# Patient Record
Sex: FEMALE | Race: WHITE | Employment: FULL TIME | URBAN - METROPOLITAN AREA
[De-identification: names, ages, dates, MRNs, and addresses within clinical notes are randomized per-mention and may not be internally consistent; named-entity substitution may affect disease eponyms.]

---

## 2018-09-07 ENCOUNTER — HOSPITAL ENCOUNTER (OUTPATIENT)
Age: 43
Discharge: HOME | End: 2018-09-07
Payer: COMMERCIAL

## 2018-09-07 LAB
ALBUMIN SERPL-MCNC: 4.2 G/DL (ref 3.5–5.2)
ALP BLD-CCNC: 33 U/L (ref 35–104)
ALT SERPL-CCNC: 9 U/L (ref 0–32)
ANION GAP SERPL CALCULATED.3IONS-SCNC: 10 MMOL/L (ref 7–16)
AST SERPL-CCNC: 14 U/L (ref 0–31)
BASOPHILS ABSOLUTE: 0.04 E9/L (ref 0–0.2)
BASOPHILS RELATIVE PERCENT: 0.9 % (ref 0–2)
BILIRUB SERPL-MCNC: 0.4 MG/DL (ref 0–1.2)
BUN BLDV-MCNC: 11 MG/DL (ref 6–20)
CALCIUM SERPL-MCNC: 9 MG/DL (ref 8.6–10.2)
CHLORIDE BLD-SCNC: 107 MMOL/L (ref 98–107)
CO2: 25 MMOL/L (ref 22–29)
CREAT SERPL-MCNC: 0.9 MG/DL (ref 0.5–1)
EOSINOPHILS ABSOLUTE: 0.11 E9/L (ref 0.05–0.5)
EOSINOPHILS RELATIVE PERCENT: 2.6 % (ref 0–6)
GFR AFRICAN AMERICAN: >60
GFR NON-AFRICAN AMERICAN: >60 ML/MIN/1.73
GLUCOSE BLD-MCNC: 117 MG/DL (ref 74–109)
HBA1C MFR BLD: 5.5 % (ref 4–5.6)
HCT VFR BLD CALC: 40.6 % (ref 34–48)
HEMOGLOBIN: 13 G/DL (ref 11.5–15.5)
IMMATURE GRANULOCYTES #: 0.01 E9/L
IMMATURE GRANULOCYTES %: 0.2 % (ref 0–5)
LYMPHOCYTES ABSOLUTE: 1.88 E9/L (ref 1.5–4)
LYMPHOCYTES RELATIVE PERCENT: 43.8 % (ref 20–42)
MCH RBC QN AUTO: 29.4 PG (ref 26–35)
MCHC RBC AUTO-ENTMCNC: 32 % (ref 32–34.5)
MCV RBC AUTO: 91.9 FL (ref 80–99.9)
MONOCYTES ABSOLUTE: 0.41 E9/L (ref 0.1–0.95)
MONOCYTES RELATIVE PERCENT: 9.6 % (ref 2–12)
NEUTROPHILS ABSOLUTE: 1.84 E9/L (ref 1.8–7.3)
NEUTROPHILS RELATIVE PERCENT: 42.9 % (ref 43–80)
PDW BLD-RTO: 12.9 FL (ref 11.5–15)
PLATELET # BLD: 252 E9/L (ref 130–450)
PMV BLD AUTO: 10.5 FL (ref 7–12)
POTASSIUM SERPL-SCNC: 4.5 MMOL/L (ref 3.5–5)
RBC # BLD: 4.42 E12/L (ref 3.5–5.5)
SODIUM BLD-SCNC: 142 MMOL/L (ref 132–146)
TOTAL PROTEIN: 6.9 G/DL (ref 6.4–8.3)
VITAMIN D 25-HYDROXY: 33 NG/ML (ref 30–100)
WBC # BLD: 4.3 E9/L (ref 4.5–11.5)

## 2018-09-07 PROCEDURE — 83036 HEMOGLOBIN GLYCOSYLATED A1C: CPT

## 2018-09-07 PROCEDURE — 36415 COLL VENOUS BLD VENIPUNCTURE: CPT

## 2018-09-07 PROCEDURE — 82306 VITAMIN D 25 HYDROXY: CPT

## 2018-09-07 PROCEDURE — 80053 COMPREHEN METABOLIC PANEL: CPT

## 2018-09-07 PROCEDURE — 85025 COMPLETE CBC W/AUTO DIFF WBC: CPT

## 2018-11-05 ENCOUNTER — HOSPITAL ENCOUNTER (OUTPATIENT)
Dept: MAMMOGRAPHY | Age: 43
Discharge: HOME | End: 2018-11-07
Payer: COMMERCIAL

## 2018-11-05 DIAGNOSIS — Z12.39 BREAST SCREENING: ICD-10-CM

## 2018-11-05 PROCEDURE — 77067 SCR MAMMO BI INCL CAD: CPT

## 2018-12-05 ENCOUNTER — HOSPITAL ENCOUNTER (OUTPATIENT)
Age: 43
Discharge: HOME | End: 2018-12-05
Payer: COMMERCIAL

## 2018-12-05 LAB
ALBUMIN SERPL-MCNC: 4.3 G/DL (ref 3.5–5.2)
ALP BLD-CCNC: 34 U/L (ref 35–104)
ALT SERPL-CCNC: 9 U/L (ref 0–32)
ANION GAP SERPL CALCULATED.3IONS-SCNC: 11 MMOL/L (ref 7–16)
AST SERPL-CCNC: 16 U/L (ref 0–31)
BACTERIA: ABNORMAL /HPF
BASOPHILS ABSOLUTE: 0.03 E9/L (ref 0–0.2)
BASOPHILS RELATIVE PERCENT: 0.6 % (ref 0–2)
BILIRUB SERPL-MCNC: 0.5 MG/DL (ref 0–1.2)
BILIRUBIN URINE: NEGATIVE
BLOOD, URINE: ABNORMAL
BUN BLDV-MCNC: 15 MG/DL (ref 6–20)
CALCIUM SERPL-MCNC: 8.9 MG/DL (ref 8.6–10.2)
CHLORIDE BLD-SCNC: 104 MMOL/L (ref 98–107)
CLARITY: CLEAR
CO2: 25 MMOL/L (ref 22–29)
COLOR: YELLOW
CREAT SERPL-MCNC: 0.8 MG/DL (ref 0.5–1)
EOSINOPHILS ABSOLUTE: 0.1 E9/L (ref 0.05–0.5)
EOSINOPHILS RELATIVE PERCENT: 1.9 % (ref 0–6)
EPITHELIAL CELLS, UA: ABNORMAL /HPF
GFR AFRICAN AMERICAN: >60
GFR NON-AFRICAN AMERICAN: >60 ML/MIN/1.73
GLUCOSE BLD-MCNC: 102 MG/DL (ref 74–99)
GLUCOSE URINE: NEGATIVE MG/DL
HBA1C MFR BLD: 5.2 % (ref 4–5.6)
HCT VFR BLD CALC: 39 % (ref 34–48)
HEMOGLOBIN: 12.6 G/DL (ref 11.5–15.5)
IMMATURE GRANULOCYTES #: 0.02 E9/L
IMMATURE GRANULOCYTES %: 0.4 % (ref 0–5)
KETONES, URINE: NEGATIVE MG/DL
LEUKOCYTE ESTERASE, URINE: ABNORMAL
LYMPHOCYTES ABSOLUTE: 1.57 E9/L (ref 1.5–4)
LYMPHOCYTES RELATIVE PERCENT: 30.5 % (ref 20–42)
MCH RBC QN AUTO: 29.6 PG (ref 26–35)
MCHC RBC AUTO-ENTMCNC: 32.3 % (ref 32–34.5)
MCV RBC AUTO: 91.8 FL (ref 80–99.9)
MONOCYTES ABSOLUTE: 0.42 E9/L (ref 0.1–0.95)
MONOCYTES RELATIVE PERCENT: 8.2 % (ref 2–12)
NEUTROPHILS ABSOLUTE: 3.01 E9/L (ref 1.8–7.3)
NEUTROPHILS RELATIVE PERCENT: 58.4 % (ref 43–80)
NITRITE, URINE: NEGATIVE
PDW BLD-RTO: 13 FL (ref 11.5–15)
PH UA: 5.5 (ref 5–9)
PLATELET # BLD: 239 E9/L (ref 130–450)
PMV BLD AUTO: 10.5 FL (ref 7–12)
POTASSIUM SERPL-SCNC: 4.2 MMOL/L (ref 3.5–5)
PROTEIN UA: NEGATIVE MG/DL
RBC # BLD: 4.25 E12/L (ref 3.5–5.5)
RBC UA: ABNORMAL /HPF (ref 0–2)
SODIUM BLD-SCNC: 140 MMOL/L (ref 132–146)
SPECIFIC GRAVITY UA: >=1.03 (ref 1–1.03)
TOTAL PROTEIN: 6.9 G/DL (ref 6.4–8.3)
UROBILINOGEN, URINE: 0.2 E.U./DL
WBC # BLD: 5.2 E9/L (ref 4.5–11.5)
WBC UA: ABNORMAL /HPF (ref 0–5)

## 2018-12-05 PROCEDURE — 36415 COLL VENOUS BLD VENIPUNCTURE: CPT

## 2018-12-05 PROCEDURE — 81001 URINALYSIS AUTO W/SCOPE: CPT

## 2018-12-05 PROCEDURE — 85025 COMPLETE CBC W/AUTO DIFF WBC: CPT

## 2018-12-05 PROCEDURE — 83036 HEMOGLOBIN GLYCOSYLATED A1C: CPT

## 2018-12-05 PROCEDURE — 80053 COMPREHEN METABOLIC PANEL: CPT

## 2019-02-27 ENCOUNTER — HOSPITAL ENCOUNTER (OUTPATIENT)
Age: 44
Discharge: HOME | End: 2019-03-01
Payer: COMMERCIAL

## 2019-02-27 LAB
BACTERIA: ABNORMAL /HPF
BILIRUBIN URINE: NEGATIVE
BLOOD, URINE: ABNORMAL
CLARITY: CLEAR
COLOR: YELLOW
GLUCOSE URINE: NEGATIVE MG/DL
KETONES, URINE: NEGATIVE MG/DL
LEUKOCYTE ESTERASE, URINE: ABNORMAL
NITRITE, URINE: NEGATIVE
PH UA: 5.5 (ref 5–9)
PROTEIN UA: NEGATIVE MG/DL
RBC UA: ABNORMAL /HPF (ref 0–2)
SPECIFIC GRAVITY UA: 1.02 (ref 1–1.03)
UROBILINOGEN, URINE: 0.2 E.U./DL
WBC UA: ABNORMAL /HPF (ref 0–5)

## 2019-02-27 PROCEDURE — 81001 URINALYSIS AUTO W/SCOPE: CPT

## 2019-02-27 PROCEDURE — 87088 URINE BACTERIA CULTURE: CPT

## 2019-02-28 LAB — URINE CULTURE, ROUTINE: NORMAL

## 2019-04-05 ENCOUNTER — HOSPITAL ENCOUNTER (OUTPATIENT)
Age: 44
Discharge: HOME | End: 2019-04-05
Payer: COMMERCIAL

## 2019-04-05 LAB
ALBUMIN SERPL-MCNC: 4.5 G/DL (ref 3.5–5.2)
ALP BLD-CCNC: 39 U/L (ref 35–104)
ALT SERPL-CCNC: 10 U/L (ref 0–32)
ANION GAP SERPL CALCULATED.3IONS-SCNC: 11 MMOL/L (ref 7–16)
AST SERPL-CCNC: 17 U/L (ref 0–31)
BASOPHILS ABSOLUTE: 0.05 E9/L (ref 0–0.2)
BASOPHILS RELATIVE PERCENT: 0.9 % (ref 0–2)
BILIRUB SERPL-MCNC: 0.7 MG/DL (ref 0–1.2)
BUN BLDV-MCNC: 13 MG/DL (ref 6–20)
CALCIUM SERPL-MCNC: 9.3 MG/DL (ref 8.6–10.2)
CHLORIDE BLD-SCNC: 105 MMOL/L (ref 98–107)
CHOLESTEROL, FASTING: 170 MG/DL (ref 0–199)
CO2: 26 MMOL/L (ref 22–29)
CREAT SERPL-MCNC: 0.9 MG/DL (ref 0.5–1)
EOSINOPHILS ABSOLUTE: 0.07 E9/L (ref 0.05–0.5)
EOSINOPHILS RELATIVE PERCENT: 1.3 % (ref 0–6)
GFR AFRICAN AMERICAN: >60
GFR NON-AFRICAN AMERICAN: >60 ML/MIN/1.73
GLUCOSE FASTING: 101 MG/DL (ref 74–99)
HBA1C MFR BLD: 5.6 % (ref 4–5.6)
HCT VFR BLD CALC: 42.6 % (ref 34–48)
HDLC SERPL-MCNC: 46 MG/DL
HEMOGLOBIN: 13.6 G/DL (ref 11.5–15.5)
IMMATURE GRANULOCYTES #: 0.02 E9/L
IMMATURE GRANULOCYTES %: 0.4 % (ref 0–5)
LDL CHOLESTEROL CALCULATED: 109 MG/DL (ref 0–99)
LYMPHOCYTES ABSOLUTE: 1.87 E9/L (ref 1.5–4)
LYMPHOCYTES RELATIVE PERCENT: 34.4 % (ref 20–42)
MCH RBC QN AUTO: 29.6 PG (ref 26–35)
MCHC RBC AUTO-ENTMCNC: 31.9 % (ref 32–34.5)
MCV RBC AUTO: 92.8 FL (ref 80–99.9)
MONOCYTES ABSOLUTE: 0.43 E9/L (ref 0.1–0.95)
MONOCYTES RELATIVE PERCENT: 7.9 % (ref 2–12)
NEUTROPHILS ABSOLUTE: 3 E9/L (ref 1.8–7.3)
NEUTROPHILS RELATIVE PERCENT: 55.1 % (ref 43–80)
PDW BLD-RTO: 13.2 FL (ref 11.5–15)
PLATELET # BLD: 278 E9/L (ref 130–450)
PMV BLD AUTO: 10.7 FL (ref 7–12)
POTASSIUM SERPL-SCNC: 4.4 MMOL/L (ref 3.5–5)
RBC # BLD: 4.59 E12/L (ref 3.5–5.5)
SODIUM BLD-SCNC: 142 MMOL/L (ref 132–146)
TOTAL PROTEIN: 7.6 G/DL (ref 6.4–8.3)
TRIGLYCERIDE, FASTING: 73 MG/DL (ref 0–149)
TSH SERPL DL<=0.05 MIU/L-ACNC: 2.27 UIU/ML (ref 0.27–4.2)
VLDLC SERPL CALC-MCNC: 15 MG/DL
WBC # BLD: 5.4 E9/L (ref 4.5–11.5)

## 2019-04-05 PROCEDURE — 85025 COMPLETE CBC W/AUTO DIFF WBC: CPT

## 2019-04-05 PROCEDURE — 80061 LIPID PANEL: CPT

## 2019-04-05 PROCEDURE — 83036 HEMOGLOBIN GLYCOSYLATED A1C: CPT

## 2019-04-05 PROCEDURE — 36415 COLL VENOUS BLD VENIPUNCTURE: CPT

## 2019-04-05 PROCEDURE — 80053 COMPREHEN METABOLIC PANEL: CPT

## 2019-04-05 PROCEDURE — 84443 ASSAY THYROID STIM HORMONE: CPT

## 2019-05-01 ENCOUNTER — HOSPITAL ENCOUNTER (OUTPATIENT)
Age: 44
Discharge: HOME | End: 2019-05-03
Payer: COMMERCIAL

## 2019-05-01 LAB
BACTERIA: ABNORMAL /HPF
BILIRUBIN URINE: NEGATIVE
BLOOD, URINE: ABNORMAL
CLARITY: CLEAR
COLOR: YELLOW
GLUCOSE URINE: NEGATIVE MG/DL
KETONES, URINE: NEGATIVE MG/DL
LEUKOCYTE ESTERASE, URINE: ABNORMAL
NITRITE, URINE: POSITIVE
PH UA: 5.5 (ref 5–9)
PROTEIN UA: NEGATIVE MG/DL
RBC UA: ABNORMAL /HPF (ref 0–2)
SPECIFIC GRAVITY UA: <=1.005 (ref 1–1.03)
UROBILINOGEN, URINE: 0.2 E.U./DL
WBC UA: ABNORMAL /HPF (ref 0–5)

## 2019-05-01 PROCEDURE — 87186 SC STD MICRODIL/AGAR DIL: CPT

## 2019-05-01 PROCEDURE — 87088 URINE BACTERIA CULTURE: CPT

## 2019-05-01 PROCEDURE — 81001 URINALYSIS AUTO W/SCOPE: CPT

## 2019-05-04 LAB
ORGANISM: ABNORMAL
URINE CULTURE, ROUTINE: ABNORMAL
URINE CULTURE, ROUTINE: ABNORMAL

## 2019-06-03 ENCOUNTER — HOSPITAL ENCOUNTER (OUTPATIENT)
Age: 44
Discharge: HOME | End: 2019-06-05
Payer: COMMERCIAL

## 2019-06-03 PROCEDURE — 81001 URINALYSIS AUTO W/SCOPE: CPT

## 2019-06-03 PROCEDURE — 87088 URINE BACTERIA CULTURE: CPT

## 2019-06-04 LAB
BACTERIA: ABNORMAL /HPF
BILIRUBIN URINE: NEGATIVE
BLOOD, URINE: ABNORMAL
CLARITY: ABNORMAL
COLOR: YELLOW
EPITHELIAL CELLS, UA: ABNORMAL /HPF
GLUCOSE URINE: NEGATIVE MG/DL
KETONES, URINE: NEGATIVE MG/DL
LEUKOCYTE ESTERASE, URINE: ABNORMAL
NITRITE, URINE: POSITIVE
PH UA: 5.5 (ref 5–9)
PROTEIN UA: NEGATIVE MG/DL
RBC UA: ABNORMAL /HPF (ref 0–2)
SPECIFIC GRAVITY UA: 1.01 (ref 1–1.03)
UROBILINOGEN, URINE: 0.2 E.U./DL
WBC UA: ABNORMAL /HPF (ref 0–5)

## 2019-06-05 LAB — URINE CULTURE, ROUTINE: NORMAL

## 2019-09-30 ENCOUNTER — HOSPITAL ENCOUNTER (OUTPATIENT)
Age: 44
Discharge: HOME | End: 2019-09-30
Payer: COMMERCIAL

## 2019-09-30 ENCOUNTER — HOSPITAL ENCOUNTER (OUTPATIENT)
Dept: MAMMOGRAPHY | Age: 44
Discharge: HOME | End: 2019-10-02
Payer: COMMERCIAL

## 2019-09-30 DIAGNOSIS — N64.4 MASTODYNIA: ICD-10-CM

## 2019-09-30 LAB
ALBUMIN SERPL-MCNC: 4.1 G/DL (ref 3.5–5.2)
ALP BLD-CCNC: 41 U/L (ref 35–104)
ALT SERPL-CCNC: 11 U/L (ref 0–32)
ANION GAP SERPL CALCULATED.3IONS-SCNC: 11 MMOL/L (ref 7–16)
AST SERPL-CCNC: 17 U/L (ref 0–31)
BASOPHILS ABSOLUTE: 0.05 E9/L (ref 0–0.2)
BASOPHILS RELATIVE PERCENT: 0.9 % (ref 0–2)
BILIRUB SERPL-MCNC: <0.2 MG/DL (ref 0–1.2)
BUN BLDV-MCNC: 21 MG/DL (ref 6–20)
CALCIUM SERPL-MCNC: 8.6 MG/DL (ref 8.6–10.2)
CHLORIDE BLD-SCNC: 106 MMOL/L (ref 98–107)
CO2: 24 MMOL/L (ref 22–29)
CREAT SERPL-MCNC: 0.8 MG/DL (ref 0.5–1)
EOSINOPHILS ABSOLUTE: 0.12 E9/L (ref 0.05–0.5)
EOSINOPHILS RELATIVE PERCENT: 2.1 % (ref 0–6)
GFR AFRICAN AMERICAN: >60
GFR NON-AFRICAN AMERICAN: >60 ML/MIN/1.73
GLUCOSE BLD-MCNC: 106 MG/DL (ref 74–99)
HBA1C MFR BLD: 5.2 % (ref 4–5.6)
HCT VFR BLD CALC: 37.7 % (ref 34–48)
HEMOGLOBIN: 12.4 G/DL (ref 11.5–15.5)
IMMATURE GRANULOCYTES #: 0.04 E9/L
IMMATURE GRANULOCYTES %: 0.7 % (ref 0–5)
LYMPHOCYTES ABSOLUTE: 1.92 E9/L (ref 1.5–4)
LYMPHOCYTES RELATIVE PERCENT: 34.3 % (ref 20–42)
MCH RBC QN AUTO: 30.6 PG (ref 26–35)
MCHC RBC AUTO-ENTMCNC: 32.9 % (ref 32–34.5)
MCV RBC AUTO: 93.1 FL (ref 80–99.9)
MONOCYTES ABSOLUTE: 0.64 E9/L (ref 0.1–0.95)
MONOCYTES RELATIVE PERCENT: 11.4 % (ref 2–12)
NEUTROPHILS ABSOLUTE: 2.82 E9/L (ref 1.8–7.3)
NEUTROPHILS RELATIVE PERCENT: 50.6 % (ref 43–80)
PDW BLD-RTO: 13.2 FL (ref 11.5–15)
PLATELET # BLD: 237 E9/L (ref 130–450)
PMV BLD AUTO: 10.7 FL (ref 7–12)
POTASSIUM SERPL-SCNC: 4.4 MMOL/L (ref 3.5–5)
RBC # BLD: 4.05 E12/L (ref 3.5–5.5)
SODIUM BLD-SCNC: 141 MMOL/L (ref 132–146)
TOTAL PROTEIN: 6.6 G/DL (ref 6.4–8.3)
VITAMIN D 25-HYDROXY: 35 NG/ML (ref 30–100)
WBC # BLD: 5.6 E9/L (ref 4.5–11.5)

## 2019-09-30 PROCEDURE — 85025 COMPLETE CBC W/AUTO DIFF WBC: CPT

## 2019-09-30 PROCEDURE — 77065 DX MAMMO INCL CAD UNI: CPT

## 2019-09-30 PROCEDURE — 36415 COLL VENOUS BLD VENIPUNCTURE: CPT

## 2019-09-30 PROCEDURE — 83036 HEMOGLOBIN GLYCOSYLATED A1C: CPT

## 2019-09-30 PROCEDURE — 80053 COMPREHEN METABOLIC PANEL: CPT

## 2019-09-30 PROCEDURE — 82306 VITAMIN D 25 HYDROXY: CPT

## 2019-11-11 ENCOUNTER — HOSPITAL ENCOUNTER (OUTPATIENT)
Dept: MAMMOGRAPHY | Age: 44
Discharge: HOME | End: 2019-11-13
Payer: COMMERCIAL

## 2019-11-11 DIAGNOSIS — Z12.39 BREAST CANCER SCREENING: ICD-10-CM

## 2019-11-11 PROCEDURE — 77063 BREAST TOMOSYNTHESIS BI: CPT

## 2020-03-16 ENCOUNTER — HOSPITAL ENCOUNTER (OUTPATIENT)
Age: 45
Discharge: HOME | End: 2020-03-18
Payer: COMMERCIAL

## 2020-03-16 LAB
BACTERIA: ABNORMAL /HPF
BILIRUBIN URINE: NEGATIVE
BLOOD, URINE: ABNORMAL
CLARITY: CLEAR
COLOR: YELLOW
CRYSTALS, UA: ABNORMAL /HPF
GLUCOSE URINE: NEGATIVE MG/DL
KETONES, URINE: NEGATIVE MG/DL
LEUKOCYTE ESTERASE, URINE: ABNORMAL
NITRITE, URINE: NEGATIVE
PH UA: 5 (ref 5–9)
PROTEIN UA: ABNORMAL MG/DL
RBC UA: ABNORMAL /HPF (ref 0–2)
SPECIFIC GRAVITY UA: >=1.03 (ref 1–1.03)
UROBILINOGEN, URINE: 0.2 E.U./DL
WBC UA: >20 /HPF (ref 0–5)

## 2020-03-16 PROCEDURE — 81001 URINALYSIS AUTO W/SCOPE: CPT

## 2020-03-16 PROCEDURE — 87088 URINE BACTERIA CULTURE: CPT

## 2020-03-18 LAB — URINE CULTURE, ROUTINE: NORMAL

## 2020-07-13 ENCOUNTER — HOSPITAL ENCOUNTER (OUTPATIENT)
Age: 45
Discharge: HOME | End: 2020-07-15
Payer: COMMERCIAL

## 2020-07-13 LAB
BILIRUBIN URINE: NEGATIVE
BLOOD, URINE: ABNORMAL
CLARITY: ABNORMAL
COLOR: YELLOW
GLUCOSE URINE: NEGATIVE MG/DL
KETONES, URINE: NEGATIVE MG/DL
LEUKOCYTE ESTERASE, URINE: ABNORMAL
NITRITE, URINE: NEGATIVE
PH UA: 5 (ref 5–9)
PROTEIN UA: NEGATIVE MG/DL
SPECIFIC GRAVITY UA: >=1.03 (ref 1–1.03)
UROBILINOGEN, URINE: 0.2 E.U./DL

## 2020-07-13 PROCEDURE — 87088 URINE BACTERIA CULTURE: CPT

## 2020-07-13 PROCEDURE — 81001 URINALYSIS AUTO W/SCOPE: CPT

## 2020-07-14 LAB
BACTERIA: ABNORMAL /HPF
RBC UA: ABNORMAL /HPF (ref 0–2)
WBC UA: ABNORMAL /HPF (ref 0–5)

## 2020-07-16 LAB — URINE CULTURE, ROUTINE: NORMAL

## 2022-10-27 ENCOUNTER — TELEPHONE (OUTPATIENT)
Dept: INTERNAL MEDICINE CLINIC | Age: 47
End: 2022-10-27

## 2023-06-05 ENCOUNTER — OFFICE VISIT (OUTPATIENT)
Dept: INTERNAL MEDICINE CLINIC | Age: 48
End: 2023-06-05

## 2023-06-05 VITALS
HEART RATE: 80 BPM | OXYGEN SATURATION: 98 % | BODY MASS INDEX: 31.24 KG/M2 | WEIGHT: 183 LBS | TEMPERATURE: 97.9 F | HEIGHT: 64 IN | DIASTOLIC BLOOD PRESSURE: 76 MMHG | SYSTOLIC BLOOD PRESSURE: 132 MMHG

## 2023-06-05 DIAGNOSIS — Z12.31 ENCOUNTER FOR SCREENING MAMMOGRAM FOR BREAST CANCER: ICD-10-CM

## 2023-06-05 DIAGNOSIS — R73.01 IMPAIRED FASTING GLUCOSE: ICD-10-CM

## 2023-06-05 DIAGNOSIS — E55.9 VITAMIN D DEFICIENCY: ICD-10-CM

## 2023-06-05 DIAGNOSIS — R35.0 FREQUENT URINATION: Primary | ICD-10-CM

## 2023-06-05 DIAGNOSIS — R35.0 FREQUENT URINATION: ICD-10-CM

## 2023-06-05 DIAGNOSIS — E66.9 OBESITY (BMI 30-39.9): ICD-10-CM

## 2023-06-05 DIAGNOSIS — Z12.11 ENCOUNTER FOR SCREENING FOR MALIGNANT NEOPLASM OF COLON: ICD-10-CM

## 2023-06-05 LAB
BACTERIA URNS QL MICRO: ABNORMAL /HPF
BILIRUB UR QL STRIP: NEGATIVE
BILIRUBIN, POC: NEGATIVE
BLOOD URINE, POC: ABNORMAL
CLARITY UR: CLEAR
CLARITY, POC: ABNORMAL
COLOR UR: YELLOW
COLOR, POC: CLEAR
EPI CELLS #/AREA URNS HPF: ABNORMAL /HPF
GLUCOSE UR STRIP-MCNC: NEGATIVE MG/DL
GLUCOSE URINE, POC: NEGATIVE
HGB UR QL STRIP: ABNORMAL
KETONES UR STRIP-MCNC: NEGATIVE MG/DL
KETONES, POC: NEGATIVE
LEUKOCYTE EST, POC: ABNORMAL
LEUKOCYTE ESTERASE UR QL STRIP: ABNORMAL
NITRITE UR QL STRIP: NEGATIVE
NITRITE, POC: NEGATIVE
PH UR STRIP: 6 [PH] (ref 5–9)
PH, POC: 6
PROT UR STRIP-MCNC: NEGATIVE MG/DL
PROTEIN, POC: NEGATIVE
RBC #/AREA URNS HPF: ABNORMAL /HPF (ref 0–2)
SP GR UR STRIP: <=1.005 (ref 1–1.03)
SPECIFIC GRAVITY, POC: 1
UROBILINOGEN UR STRIP-ACNC: 0.2 E.U./DL
UROBILINOGEN, POC: 0.2
WBC #/AREA URNS HPF: >20 /HPF (ref 0–5)

## 2023-06-05 PROCEDURE — 99214 OFFICE O/P EST MOD 30 MIN: CPT | Performed by: INTERNAL MEDICINE

## 2023-06-05 PROCEDURE — 81002 URINALYSIS NONAUTO W/O SCOPE: CPT | Performed by: INTERNAL MEDICINE

## 2023-06-05 RX ORDER — AMOXICILLIN 500 MG/1
500 CAPSULE ORAL 3 TIMES DAILY
Qty: 21 CAPSULE | Refills: 0 | Status: SHIPPED | OUTPATIENT
Start: 2023-06-05 | End: 2023-06-12

## 2023-06-05 SDOH — ECONOMIC STABILITY: INCOME INSECURITY: HOW HARD IS IT FOR YOU TO PAY FOR THE VERY BASICS LIKE FOOD, HOUSING, MEDICAL CARE, AND HEATING?: NOT HARD AT ALL

## 2023-06-05 SDOH — ECONOMIC STABILITY: FOOD INSECURITY: WITHIN THE PAST 12 MONTHS, THE FOOD YOU BOUGHT JUST DIDN'T LAST AND YOU DIDN'T HAVE MONEY TO GET MORE.: NEVER TRUE

## 2023-06-05 SDOH — ECONOMIC STABILITY: HOUSING INSECURITY
IN THE LAST 12 MONTHS, WAS THERE A TIME WHEN YOU DID NOT HAVE A STEADY PLACE TO SLEEP OR SLEPT IN A SHELTER (INCLUDING NOW)?: NO

## 2023-06-05 SDOH — ECONOMIC STABILITY: FOOD INSECURITY: WITHIN THE PAST 12 MONTHS, YOU WORRIED THAT YOUR FOOD WOULD RUN OUT BEFORE YOU GOT MONEY TO BUY MORE.: NEVER TRUE

## 2023-06-05 ASSESSMENT — ENCOUNTER SYMPTOMS
SORE THROAT: 0
SHORTNESS OF BREATH: 0
BACK PAIN: 0
TROUBLE SWALLOWING: 0
NAUSEA: 0
EYE PAIN: 0
RHINORRHEA: 0
VOMITING: 0
CHEST TIGHTNESS: 0
VOICE CHANGE: 0
PHOTOPHOBIA: 0
CONSTIPATION: 0
ABDOMINAL PAIN: 0
WHEEZING: 0
DIARRHEA: 0
COUGH: 0
BLOOD IN STOOL: 0

## 2023-06-05 ASSESSMENT — PATIENT HEALTH QUESTIONNAIRE - PHQ9
2. FEELING DOWN, DEPRESSED OR HOPELESS: 0
SUM OF ALL RESPONSES TO PHQ QUESTIONS 1-9: 0
1. LITTLE INTEREST OR PLEASURE IN DOING THINGS: 0
SUM OF ALL RESPONSES TO PHQ9 QUESTIONS 1 & 2: 0
SUM OF ALL RESPONSES TO PHQ QUESTIONS 1-9: 0

## 2023-06-05 NOTE — PROGRESS NOTES
Vitamin D 25 Hydroxy; Future  4. Encounter for screening mammogram for breast cancer  -     [facility]O DIGITAL SCREEN W OR WO CAD BILATERAL; Future  5. Encounter for screening for malignant neoplasm of colon  Patient did not have a colonoscopy in the past.  She wants referred to gastroenterologist Dr. Carley Vasquez  Referral placed. -     Amb External Referral To Gastroenterology  6. Obesity (BMI 30-39. 9)  Diet and exercise advised. Check the following labs  -     Lipid, Fasting; Future  -     TSH; Future      Return in about 4 weeks (around 7/3/2023). An electronic signature was used to authenticate this note.     --Vy Peng MD

## 2023-06-08 LAB — BACTERIA UR CULT: NORMAL

## 2023-06-22 DIAGNOSIS — M25.511 ACUTE PAIN OF RIGHT SHOULDER: Primary | ICD-10-CM

## 2023-06-23 ENCOUNTER — OFFICE VISIT (OUTPATIENT)
Dept: ORTHOPEDIC SURGERY | Age: 48
End: 2023-06-23

## 2023-06-23 VITALS — HEIGHT: 64 IN | BODY MASS INDEX: 30.73 KG/M2 | WEIGHT: 180 LBS | TEMPERATURE: 98 F

## 2023-06-23 DIAGNOSIS — M75.101 TEAR OF RIGHT ROTATOR CUFF, UNSPECIFIED TEAR EXTENT, UNSPECIFIED WHETHER TRAUMATIC: Primary | ICD-10-CM

## 2023-06-23 PROCEDURE — 99203 OFFICE O/P NEW LOW 30 MIN: CPT | Performed by: ORTHOPAEDIC SURGERY

## 2023-06-23 RX ORDER — OMEPRAZOLE 20 MG/1
20 CAPSULE, DELAYED RELEASE ORAL
Qty: 60 CAPSULE | Refills: 0 | Status: SHIPPED | OUTPATIENT
Start: 2023-06-23

## 2023-06-23 RX ORDER — NAPROXEN 500 MG/1
500 TABLET ORAL 2 TIMES DAILY WITH MEALS
Qty: 60 TABLET | Refills: 0 | Status: SHIPPED | OUTPATIENT
Start: 2023-06-23 | End: 2023-07-23

## 2023-07-14 ENCOUNTER — OFFICE VISIT (OUTPATIENT)
Dept: ORTHOPEDIC SURGERY | Age: 48
End: 2023-07-14

## 2023-07-14 VITALS — BODY MASS INDEX: 29.02 KG/M2 | WEIGHT: 170 LBS | TEMPERATURE: 98 F | HEIGHT: 64 IN

## 2023-07-14 DIAGNOSIS — Z71.82 EXERCISE COUNSELING: ICD-10-CM

## 2023-07-14 DIAGNOSIS — M75.41 IMPINGEMENT SYNDROME OF RIGHT SHOULDER: ICD-10-CM

## 2023-07-14 DIAGNOSIS — M75.101 TEAR OF RIGHT ROTATOR CUFF, UNSPECIFIED TEAR EXTENT, UNSPECIFIED WHETHER TRAUMATIC: Primary | ICD-10-CM

## 2023-07-14 RX ORDER — TRIAMCINOLONE ACETONIDE 40 MG/ML
40 INJECTION, SUSPENSION INTRA-ARTICULAR; INTRAMUSCULAR ONCE
Status: COMPLETED | OUTPATIENT
Start: 2023-07-14 | End: 2023-07-14

## 2023-07-14 RX ADMIN — TRIAMCINOLONE ACETONIDE 40 MG: 40 INJECTION, SUSPENSION INTRA-ARTICULAR; INTRAMUSCULAR at 14:30

## 2024-01-23 ENCOUNTER — TELEPHONE (OUTPATIENT)
Dept: INTERNAL MEDICINE CLINIC | Age: 49
End: 2024-01-23

## 2024-01-23 RX ORDER — AZITHROMYCIN 250 MG/1
250 TABLET, FILM COATED ORAL SEE ADMIN INSTRUCTIONS
Qty: 6 TABLET | Refills: 0 | Status: SHIPPED | OUTPATIENT
Start: 2024-01-23 | End: 2024-01-28

## 2024-01-23 NOTE — TELEPHONE ENCOUNTER
Tested positive for Covid last Wed / she did the vitamin regimen for about 5 days, but she still has really bad sinus drainage and fatigue / can you give her something for this?

## 2024-01-23 NOTE — TELEPHONE ENCOUNTER
Please inform patient her blood work is within normal limits.  The only abnormal level her vitamin D which is low.  Please advise patient to take vitamin D3 2000 units daily over-the-counter.  Thank

## 2024-08-12 ENCOUNTER — TELEPHONE (OUTPATIENT)
Dept: INTERNAL MEDICINE CLINIC | Age: 49
End: 2024-08-12

## 2024-08-12 ENCOUNTER — LAB (OUTPATIENT)
Dept: INTERNAL MEDICINE CLINIC | Age: 49
End: 2024-08-12

## 2024-08-12 DIAGNOSIS — R82.90 CLOUDY URINE: ICD-10-CM

## 2024-08-12 DIAGNOSIS — R39.9 UTI SYMPTOMS: Primary | ICD-10-CM

## 2024-08-12 DIAGNOSIS — R35.0 FREQUENCY OF URINATION: ICD-10-CM

## 2024-08-12 LAB
BILIRUBIN, POC: NORMAL
BLOOD URINE, POC: NORMAL
CLARITY, POC: NORMAL
COLOR, POC: YELLOW
GLUCOSE URINE, POC: NEGATIVE
KETONES, POC: NORMAL
LEUKOCYTE EST, POC: NORMAL
NITRITE, POC: NORMAL
PH, POC: 5
PROTEIN, POC: NORMAL
SPECIFIC GRAVITY, POC: 1.01
UROBILINOGEN, POC: NORMAL

## 2024-08-12 PROCEDURE — 81002 URINALYSIS NONAUTO W/O SCOPE: CPT | Performed by: INTERNAL MEDICINE

## 2024-08-12 NOTE — TELEPHONE ENCOUNTER
Pt left a u/a states she has frequent urination,, and cloudy urine pt goes to [facility]

## 2024-08-13 ENCOUNTER — TELEPHONE (OUTPATIENT)
Dept: INTERNAL MEDICINE CLINIC | Age: 49
End: 2024-08-13

## 2024-08-13 NOTE — TELEPHONE ENCOUNTER
Patient called for urine cx results / Culture is still in process and advised patient that it may take another day / will call with results as soon as soon as possible.

## 2024-08-14 ENCOUNTER — TELEPHONE (OUTPATIENT)
Dept: INTERNAL MEDICINE CLINIC | Age: 49
End: 2024-08-14

## 2024-08-14 LAB
CULTURE: NORMAL
CULTURE: NORMAL
SPECIMEN DESCRIPTION: NORMAL

## 2024-08-14 NOTE — TELEPHONE ENCOUNTER
Please advise patient her urine culture is not suggestive of any infection.  To make sure she increases fluids.  Let us know if any issues.  Thank you

## 2024-10-14 ENCOUNTER — OFFICE VISIT (OUTPATIENT)
Dept: INTERNAL MEDICINE CLINIC | Age: 49
End: 2024-10-14

## 2024-10-14 VITALS
OXYGEN SATURATION: 97 % | HEART RATE: 105 BPM | BODY MASS INDEX: 31.58 KG/M2 | WEIGHT: 185 LBS | SYSTOLIC BLOOD PRESSURE: 122 MMHG | DIASTOLIC BLOOD PRESSURE: 82 MMHG | HEIGHT: 64 IN | TEMPERATURE: 97.9 F

## 2024-10-14 DIAGNOSIS — R35.0 URINARY FREQUENCY: Primary | ICD-10-CM

## 2024-10-14 DIAGNOSIS — E55.9 VITAMIN D DEFICIENCY: ICD-10-CM

## 2024-10-14 DIAGNOSIS — Z12.11 ENCOUNTER FOR SCREENING FOR MALIGNANT NEOPLASM OF COLON: ICD-10-CM

## 2024-10-14 DIAGNOSIS — Z12.31 ENCOUNTER FOR SCREENING MAMMOGRAM FOR BREAST CANCER: ICD-10-CM

## 2024-10-14 DIAGNOSIS — R73.01 IMPAIRED FASTING GLUCOSE: ICD-10-CM

## 2024-10-14 DIAGNOSIS — K43.9 HERNIA OF ANTERIOR ABDOMINAL WALL: ICD-10-CM

## 2024-10-14 DIAGNOSIS — E66.9 OBESITY (BMI 30-39.9): ICD-10-CM

## 2024-10-14 LAB
BILIRUBIN, POC: NORMAL
BLOOD URINE, POC: NORMAL
CLARITY, POC: NORMAL
COLOR, POC: YELLOW
GLUCOSE URINE, POC: NORMAL MG/DL
KETONES, POC: NORMAL MG/DL
LEUKOCYTE EST, POC: NORMAL
NITRITE, POC: POSITIVE
PH, POC: 5.5
PROTEIN, POC: NORMAL MG/DL
SPECIFIC GRAVITY, POC: 1.02
UROBILINOGEN, POC: 0.2 MG/DL

## 2024-10-14 PROCEDURE — 99214 OFFICE O/P EST MOD 30 MIN: CPT | Performed by: INTERNAL MEDICINE

## 2024-10-14 PROCEDURE — 81002 URINALYSIS NONAUTO W/O SCOPE: CPT | Performed by: INTERNAL MEDICINE

## 2024-10-14 RX ORDER — CEPHALEXIN 500 MG/1
500 CAPSULE ORAL 3 TIMES DAILY
Qty: 21 CAPSULE | Refills: 0 | Status: SHIPPED | OUTPATIENT
Start: 2024-10-14 | End: 2024-10-21

## 2024-10-14 RX ORDER — CEPHALEXIN 500 MG/1
500 CAPSULE ORAL 3 TIMES DAILY
Qty: 21 CAPSULE | Refills: 0 | Status: SHIPPED
Start: 2024-10-14 | End: 2024-10-14 | Stop reason: SDUPTHER

## 2024-10-14 SDOH — ECONOMIC STABILITY: FOOD INSECURITY: WITHIN THE PAST 12 MONTHS, THE FOOD YOU BOUGHT JUST DIDN'T LAST AND YOU DIDN'T HAVE MONEY TO GET MORE.: NEVER TRUE

## 2024-10-14 SDOH — ECONOMIC STABILITY: FOOD INSECURITY: WITHIN THE PAST 12 MONTHS, YOU WORRIED THAT YOUR FOOD WOULD RUN OUT BEFORE YOU GOT MONEY TO BUY MORE.: NEVER TRUE

## 2024-10-14 SDOH — ECONOMIC STABILITY: INCOME INSECURITY: HOW HARD IS IT FOR YOU TO PAY FOR THE VERY BASICS LIKE FOOD, HOUSING, MEDICAL CARE, AND HEATING?: NOT HARD AT ALL

## 2024-10-14 ASSESSMENT — ENCOUNTER SYMPTOMS
VOMITING: 0
COUGH: 0
SHORTNESS OF BREATH: 0
VOICE CHANGE: 0
PHOTOPHOBIA: 0
CONSTIPATION: 0
CHEST TIGHTNESS: 0
EYE PAIN: 0
WHEEZING: 0
SORE THROAT: 0
RHINORRHEA: 0
ABDOMINAL PAIN: 0
BLOOD IN STOOL: 0
DIARRHEA: 0
NAUSEA: 0
TROUBLE SWALLOWING: 0
BACK PAIN: 0

## 2024-10-14 ASSESSMENT — PATIENT HEALTH QUESTIONNAIRE - PHQ9
1. LITTLE INTEREST OR PLEASURE IN DOING THINGS: NOT AT ALL
SUM OF ALL RESPONSES TO PHQ QUESTIONS 1-9: 0
2. FEELING DOWN, DEPRESSED OR HOPELESS: NOT AT ALL
SUM OF ALL RESPONSES TO PHQ QUESTIONS 1-9: 0
SUM OF ALL RESPONSES TO PHQ QUESTIONS 1-9: 0
SUM OF ALL RESPONSES TO PHQ9 QUESTIONS 1 & 2: 0
SUM OF ALL RESPONSES TO PHQ QUESTIONS 1-9: 0

## 2024-10-14 NOTE — PROGRESS NOTES
Yelena Harrell (:  1975) is a 49 y.o. female,Established patient, here for evaluation of the following chief complaint(s):  Urinary Tract Infection, Mass (Lump in abdomen), and Check-Up (Year visit)        Subjective   SUBJECTIVE/OBJECTIVE:  HPI  Patient is here for follow-up today.  She has not been seen for almost a year.  She complains of feeling a lump in the middle of her abdomen.  She had it for several months.  She is also been having frequency and burning with urination for the last 2 weeks.  No fevers no chills.  No recent changes with medications.  She is  only on birth control pills.  Review of Systems   Constitutional:  Negative for chills, fatigue, fever and unexpected weight change.   HENT:  Negative for congestion, postnasal drip, rhinorrhea, sore throat, trouble swallowing and voice change.    Eyes:  Negative for photophobia, pain and visual disturbance.   Respiratory:  Negative for cough, chest tightness, shortness of breath and wheezing.    Cardiovascular:  Negative for chest pain and palpitations.   Gastrointestinal:  Negative for abdominal pain, blood in stool, constipation, diarrhea, nausea and vomiting.   Endocrine: Negative for heat intolerance, polydipsia and polyuria.   Genitourinary:  Positive for dysuria and frequency. Negative for difficulty urinating, hematuria and urgency.   Musculoskeletal:  Negative for arthralgias, back pain, neck pain and neck stiffness.   Skin:  Negative for pallor.   Neurological: Negative.  Negative for dizziness and light-headedness.   Hematological:  Does not bruise/bleed easily.          Objective   /82   Pulse (!) 105   Temp 97.9 °F (36.6 °C) (Temporal)   Ht 1.626 m (5' 4\")   Wt 83.9 kg (185 lb)   SpO2 97%   BMI 31.76 kg/m²       The ASCVD Risk score ([place] DK, et al., 2019) failed to calculate for the following reasons:    Cannot find a previous HDL lab    Cannot find a previous total cholesterol lab     Physical

## 2024-10-17 ENCOUNTER — TELEPHONE (OUTPATIENT)
Dept: INTERNAL MEDICINE CLINIC | Age: 49
End: 2024-10-17

## 2024-10-17 LAB
CULTURE: ABNORMAL
SPECIMEN DESCRIPTION: ABNORMAL

## 2024-10-17 RX ORDER — LEVOFLOXACIN 500 MG/1
500 TABLET, FILM COATED ORAL DAILY
Qty: 7 TABLET | Refills: 0 | Status: SHIPPED
Start: 2024-10-17 | End: 2024-10-17 | Stop reason: SDUPTHER

## 2024-10-17 RX ORDER — LEVOFLOXACIN 500 MG/1
500 TABLET, FILM COATED ORAL DAILY
Qty: 7 TABLET | Refills: 0 | Status: SHIPPED | OUTPATIENT
Start: 2024-10-17 | End: 2024-10-24

## 2024-11-05 ENCOUNTER — INITIAL CONSULT (OUTPATIENT)
Dept: SURGERY | Age: 49
End: 2024-11-05

## 2024-11-05 VITALS
HEIGHT: 64 IN | HEART RATE: 82 BPM | SYSTOLIC BLOOD PRESSURE: 128 MMHG | TEMPERATURE: 98 F | BODY MASS INDEX: 31.76 KG/M2 | DIASTOLIC BLOOD PRESSURE: 81 MMHG

## 2024-11-05 DIAGNOSIS — M62.08 DIASTASIS RECTI: Primary | ICD-10-CM

## 2024-11-05 PROCEDURE — 99203 OFFICE O/P NEW LOW 30 MIN: CPT | Performed by: SURGERY

## 2024-11-05 NOTE — PROGRESS NOTES
procedure. All questions were answered to the patient's satisfaction and they freely signed the consent.         Rick Osborne MD  8:46 AM  11/5/2024

## 2024-11-19 ENCOUNTER — TELEPHONE (OUTPATIENT)
Dept: SURGERY | Age: 49
End: 2024-11-19

## 2024-11-19 NOTE — TELEPHONE ENCOUNTER
Patient cancelled CT scan.  Call placed to discuss if patient would be rescheduling the testing.  Patient voiced that she is going to have surgery on her shoulder and will call back when she is ready to proceed with her CT Scan and reschedule her follow up.    Electronically signed by Mayra Bentley on 11/19/24 at 3:20 PM EST

## 2025-01-03 ENCOUNTER — TELEPHONE (OUTPATIENT)
Dept: SURGERY | Age: 50
End: 2025-01-03

## 2025-01-03 NOTE — TELEPHONE ENCOUNTER
Patient has CT orders from November that she would like faxed to [facility].  Fax to:  [phone] or back up fax:  [phone]

## 2025-02-02 ENCOUNTER — HOSPITAL ENCOUNTER (OUTPATIENT)
Dept: CT IMAGING | Age: 50
Discharge: HOME | End: 2025-02-02
Attending: SURGERY

## 2025-02-02 DIAGNOSIS — M62.08 DIASTASIS RECTI: ICD-10-CM

## 2025-02-02 PROCEDURE — 74176 CT ABD & PELVIS W/O CONTRAST: CPT

## 2025-02-04 ENCOUNTER — TELEPHONE (OUTPATIENT)
Dept: SURGERY | Age: 50
End: 2025-02-04

## 2025-02-04 NOTE — TELEPHONE ENCOUNTER
Call placed to inform patient that we do have the results of her CT Scan.  Patient is able to proceed with hernia repair if she would like.  Patient may also schedule follow up in office to review results.  Message left for patient to please return my call.  Electronically signed by Mayra Bentley on 2/4/25 at 10:39 AM EST

## 2025-02-07 ENCOUNTER — TELEPHONE (OUTPATIENT)
Dept: SURGERY | Age: 50
End: 2025-02-07

## 2025-02-07 PROBLEM — K43.9 VENTRAL HERNIA: Status: ACTIVE | Noted: 2025-02-07

## 2025-02-07 NOTE — TELEPHONE ENCOUNTER
Per the order of Dr. Osborne, patient has been scheduled for Laparoscopic robotic ventral hernia repair  on 4.4.2025.  Patient provided with procedure information over the phone and informed that written information will be mailed to her.  Patient instructed to please contact our office with any questions.    Procedure scheduled through [facility].  Dr. Osborne to enter orders.    Electronically signed by Mayra Bentley on 2/7/25 at 8:14 AM EST

## 2025-02-17 ENCOUNTER — LAB (OUTPATIENT)
Dept: INTERNAL MEDICINE CLINIC | Age: 50
End: 2025-02-17

## 2025-02-17 DIAGNOSIS — R35.0 URINARY FREQUENCY: Primary | ICD-10-CM

## 2025-02-17 DIAGNOSIS — R31.9 HEMATURIA, UNSPECIFIED TYPE: ICD-10-CM

## 2025-02-17 DIAGNOSIS — R82.998 LEUKOCYTES IN URINE: ICD-10-CM

## 2025-02-17 LAB
BILIRUBIN, POC: NEGATIVE
BILIRUBIN, URINE: NEGATIVE
BLOOD URINE, POC: NORMAL
CLARITY, POC: NORMAL
COLOR, POC: YELLOW
COLOR, UA: YELLOW
CRYSTALS, UA: ABNORMAL /HPF
EPITHELIAL CELLS, UA: ABNORMAL /HPF
GLUCOSE URINE, POC: NEGATIVE MG/DL
GLUCOSE URINE: NEGATIVE MG/DL
KETONES, POC: NEGATIVE MG/DL
KETONES, URINE: NEGATIVE MG/DL
LEUKOCYTE EST, POC: NORMAL
LEUKOCYTE ESTERASE, URINE: ABNORMAL
NITRITE, POC: NEGATIVE
NITRITE, URINE: NEGATIVE
PH, POC: 5.5
PH, URINE: 6 (ref 5–8)
PROTEIN UA: ABNORMAL MG/DL
PROTEIN, POC: 30 MG/DL
RBC UA: ABNORMAL /HPF
SPECIFIC GRAVITY UA: >1.03 (ref 1–1.03)
SPECIFIC GRAVITY, POC: 1.03
TURBIDITY: ABNORMAL
URINE HGB: ABNORMAL
UROBILINOGEN, POC: 0.2 MG/DL
UROBILINOGEN, URINE: 0.2 EU/DL (ref 0–1)
WBC UA: ABNORMAL /HPF

## 2025-02-17 PROCEDURE — 81002 URINALYSIS NONAUTO W/O SCOPE: CPT | Performed by: INTERNAL MEDICINE

## 2025-02-19 ENCOUNTER — TELEPHONE (OUTPATIENT)
Dept: INTERNAL MEDICINE CLINIC | Age: 50
End: 2025-02-19

## 2025-02-19 RX ORDER — LEVOFLOXACIN 500 MG/1
500 TABLET, FILM COATED ORAL DAILY
Qty: 7 TABLET | Refills: 0 | Status: SHIPPED | OUTPATIENT
Start: 2025-02-19 | End: 2025-02-26

## 2025-02-20 LAB
CULTURE: ABNORMAL
CULTURE: ABNORMAL
SPECIMEN DESCRIPTION: ABNORMAL

## 2025-02-21 ENCOUNTER — TELEPHONE (OUTPATIENT)
Dept: INTERNAL MEDICINE CLINIC | Age: 50
End: 2025-02-21

## 2025-02-21 RX ORDER — AMOXICILLIN 500 MG/1
500 CAPSULE ORAL 3 TIMES DAILY
Qty: 21 CAPSULE | Refills: 0 | Status: SHIPPED | OUTPATIENT
Start: 2025-02-21 | End: 2025-02-28

## 2025-02-21 NOTE — TELEPHONE ENCOUNTER
Pt states that the last time pt took antibiotic it upset her stomach.   Pt is requesting something different.    [facility]     Last Appointment:  10/14/2024  Future Appointments   Date Time Provider Department Center   4/16/2025  1:00 PM Rick Osborne MD [facility]       (4) rarely moist

## 2025-02-28 ENCOUNTER — TELEPHONE (OUTPATIENT)
Dept: ADMINISTRATIVE | Age: 50
End: 2025-02-28

## 2025-03-03 NOTE — TELEPHONE ENCOUNTER
Return call placed to discuss questions.  Patient informed that the hospital will reach out to schedule her pre op appointment.  Patient had no further questions.  Electronically signed by Mayra Bentley on 3/3/25 at 11:38 AM EST

## 2025-03-29 ENCOUNTER — ANESTHESIA EVENT (OUTPATIENT)
Dept: OPERATING ROOM | Age: 50
End: 2025-03-29

## 2025-04-03 NOTE — H&P
General Surgery History and Physical  [facility]    Patient's Name/Date of Birth: Yelena Harrell / 1975    Date: April 4, 2025     Surgeon: Rick Osborne MD    PCP: Awadalla, Bahaa A, MD     Chief Complaint: Abdominal bulge    HPI:   Yelena Harrell is a 49 y.o. female who presents for evaluation of abdominal bulge with intermittent pressure and pain. Timing is constant, radiation to mid abdomen, alleviated by rest and started several months ago and severity is up to 8/10. she has no history of multiple abdominal surgeries. she has no history of previous repairs of the hernia. Denies nausea, vomiting, fever, chills, SOB, chest pain, diarrhea or constipation. No history of abnormal colonoscopy.    Patient Active Problem List   Diagnosis    Impaired fasting glucose    Vitamin D deficiency    Obesity (BMI 30-39.9)    Diastasis recti    Ventral hernia       Past Medical History:   Diagnosis Date    Impaired fasting glucose     Ovarian cyst     Retinitis pigmentosa     Vitamin D deficiency        Past Surgical History:   Procedure Laterality Date    CATARACT EXTRACTION, BILATERAL      SHOULDER SURGERY Right     frozen shoulder       No Known Allergies    No current facility-administered medications on file prior to encounter.     Current Outpatient Medications on File Prior to Encounter   Medication Sig Dispense Refill    norethindrone (AYGESTIN) 5 MG tablet Take 1 tablet by mouth daily      acetaminophen (TYLENOL) 500 MG tablet Take 2 tablets by mouth every 6 hours as needed for Pain           The patient has a family history that is negative for severe cardiovascular or respiratory issues, negative for reaction to anesthesia. No history of gastrointestinal cancer was reported in their mother or father.    Time spent reviewing past medical, surgical, social and family history, vitals, nursing assessment and images. No changes from above documented history.    Social History

## 2025-04-03 NOTE — PROGRESS NOTES
someone to stay with you.    PEDIATRIC PATIENTS ONLY:  A parent/legal guardian must accompany a child scheduled for surgery and plan to stay at the hospital until the child is discharged.  Please do not bring other children with you.    Please wear simple, loose fitting clothing to the hospital.  Do not bring valuables (money, credit cards, checkbooks, etc.) Do not wear any makeup (including no eye makeup) or nail polish on your fingers or toes.    DO NOT wear any jewelry or piercings on day of surgery.  All body piercings and jewelry must be removed.    Shower the MORNING of surgery with an Antibacterial soap.    HYSTERECTOMY patients ONLY - Remember to bring the green Blood Bank bracelet to the hospital on the day of surgery.    If you have a Living Will and/or Durable Power of  for Healthcare, please bring in a copy.    If appropriate bring crutches, inspirex, walker, cane etc...    Notify your Surgeon if you develop any illness between now and surgery time, cough, cold, fever, sore throat, nausea, vomiting, etc.  Please notify your surgeon if you experience dizziness, shortness of breath or blurred vision between now & the time of your surgery.    If you wear dentures, they will need to be removed before going into surgery, we will provide you with a container. If you wear contact lenses or glasses, they will need to be removed, please bring a case for them.    To provide excellent care visitors will be limited to 1 person in the room at any given time.                                                                                         No children under the age of 16 are permitted in the hospital for the safety of all patients.     Any implantable device requiring remote therapy, Please bring remote day of surgery and bring your implant card with you!      21. If you use a C-PAP please bring settings with you, do not bring the machine.    22. Other:                     Please call AMBULATORY CARE if  you have any further questions.   Pre Admit Testing           [phone]     [facility] [phone]

## 2025-04-04 ENCOUNTER — HOSPITAL ENCOUNTER (OUTPATIENT)
Age: 50
Setting detail: OUTPATIENT SURGERY
Discharge: HOME | End: 2025-04-04
Attending: SURGERY | Admitting: SURGERY

## 2025-04-04 ENCOUNTER — ANESTHESIA (OUTPATIENT)
Dept: OPERATING ROOM | Age: 50
End: 2025-04-04

## 2025-04-04 VITALS
HEIGHT: 63 IN | HEART RATE: 57 BPM | DIASTOLIC BLOOD PRESSURE: 64 MMHG | BODY MASS INDEX: 32.78 KG/M2 | SYSTOLIC BLOOD PRESSURE: 108 MMHG | OXYGEN SATURATION: 95 % | WEIGHT: 185 LBS | TEMPERATURE: 97.2 F | RESPIRATION RATE: 18 BRPM

## 2025-04-04 DIAGNOSIS — K43.6 VENTRAL HERNIA WITH OBSTRUCTION AND WITHOUT GANGRENE: Primary | ICD-10-CM

## 2025-04-04 PROCEDURE — 7100000000 HC PACU RECOVERY - FIRST 15 MIN: Performed by: SURGERY

## 2025-04-04 PROCEDURE — 6360000002 HC RX W HCPCS: Performed by: ANESTHESIOLOGY

## 2025-04-04 PROCEDURE — 7100000010 HC PHASE II RECOVERY - FIRST 15 MIN: Performed by: SURGERY

## 2025-04-04 PROCEDURE — 2500000003 HC RX 250 WO HCPCS

## 2025-04-04 PROCEDURE — 2580000003 HC RX 258

## 2025-04-04 PROCEDURE — 6360000002 HC RX W HCPCS

## 2025-04-04 PROCEDURE — 6370000000 HC RX 637 (ALT 250 FOR IP): Performed by: ANESTHESIOLOGY

## 2025-04-04 PROCEDURE — 2709999900 HC NON-CHARGEABLE SUPPLY: Performed by: SURGERY

## 2025-04-04 PROCEDURE — 7100000001 HC PACU RECOVERY - ADDTL 15 MIN: Performed by: SURGERY

## 2025-04-04 PROCEDURE — 3700000001 HC ADD 15 MINUTES (ANESTHESIA): Performed by: SURGERY

## 2025-04-04 PROCEDURE — C1781 MESH (IMPLANTABLE): HCPCS | Performed by: SURGERY

## 2025-04-04 PROCEDURE — S2900 ROBOTIC SURGICAL SYSTEM: HCPCS | Performed by: SURGERY

## 2025-04-04 PROCEDURE — 3700000000 HC ANESTHESIA ATTENDED CARE: Performed by: SURGERY

## 2025-04-04 PROCEDURE — 3600000009 HC SURGERY ROBOT BASE: Performed by: SURGERY

## 2025-04-04 PROCEDURE — 49594 RPR AA HRN 1ST 3-10 NCR/STRN: CPT | Performed by: SURGERY

## 2025-04-04 PROCEDURE — 7100000011 HC PHASE II RECOVERY - ADDTL 15 MIN: Performed by: SURGERY

## 2025-04-04 PROCEDURE — 3600000019 HC SURGERY ROBOT ADDTL 15MIN: Performed by: SURGERY

## 2025-04-04 DEVICE — LAPAROSCOPIC SELF-FIXATING MESH POLYESTER WITH POLYLACTIC ACID GRIPS AND COLLAGEN FILM
Type: IMPLANTABLE DEVICE | Site: ABDOMEN | Status: FUNCTIONAL
Brand: PROGRIP

## 2025-04-04 RX ORDER — NALOXONE HYDROCHLORIDE 0.4 MG/ML
INJECTION, SOLUTION INTRAMUSCULAR; INTRAVENOUS; SUBCUTANEOUS PRN
Status: DISCONTINUED | OUTPATIENT
Start: 2025-04-04 | End: 2025-04-04 | Stop reason: HOSPADM

## 2025-04-04 RX ORDER — SODIUM CHLORIDE 9 MG/ML
INJECTION, SOLUTION INTRAVENOUS PRN
Status: DISCONTINUED | OUTPATIENT
Start: 2025-04-04 | End: 2025-04-04 | Stop reason: HOSPADM

## 2025-04-04 RX ORDER — DROPERIDOL 2.5 MG/ML
0.62 INJECTION, SOLUTION INTRAMUSCULAR; INTRAVENOUS
Status: DISCONTINUED | OUTPATIENT
Start: 2025-04-04 | End: 2025-04-04 | Stop reason: HOSPADM

## 2025-04-04 RX ORDER — FENTANYL CITRATE 50 UG/ML
INJECTION, SOLUTION INTRAMUSCULAR; INTRAVENOUS
Status: DISCONTINUED | OUTPATIENT
Start: 2025-04-04 | End: 2025-04-04 | Stop reason: SDUPTHER

## 2025-04-04 RX ORDER — IPRATROPIUM BROMIDE AND ALBUTEROL SULFATE 2.5; .5 MG/3ML; MG/3ML
1 SOLUTION RESPIRATORY (INHALATION)
Status: DISCONTINUED | OUTPATIENT
Start: 2025-04-04 | End: 2025-04-04 | Stop reason: HOSPADM

## 2025-04-04 RX ORDER — MIDAZOLAM HYDROCHLORIDE 1 MG/ML
2 INJECTION, SOLUTION INTRAMUSCULAR; INTRAVENOUS
Status: DISCONTINUED | OUTPATIENT
Start: 2025-04-04 | End: 2025-04-04 | Stop reason: HOSPADM

## 2025-04-04 RX ORDER — SODIUM CHLORIDE 0.9 % (FLUSH) 0.9 %
5-40 SYRINGE (ML) INJECTION PRN
Status: DISCONTINUED | OUTPATIENT
Start: 2025-04-04 | End: 2025-04-04 | Stop reason: HOSPADM

## 2025-04-04 RX ORDER — METHOCARBAMOL 100 MG/ML
1000 INJECTION, SOLUTION INTRAMUSCULAR; INTRAVENOUS ONCE
Status: COMPLETED | OUTPATIENT
Start: 2025-04-04 | End: 2025-04-04

## 2025-04-04 RX ORDER — OXYCODONE AND ACETAMINOPHEN 5; 325 MG/1; MG/1
1 TABLET ORAL EVERY 6 HOURS PRN
Qty: 12 TABLET | Refills: 0 | Status: SHIPPED | OUTPATIENT
Start: 2025-04-04 | End: 2025-04-07

## 2025-04-04 RX ORDER — PROPOFOL 10 MG/ML
INJECTION, EMULSION INTRAVENOUS
Status: DISCONTINUED | OUTPATIENT
Start: 2025-04-04 | End: 2025-04-04 | Stop reason: SDUPTHER

## 2025-04-04 RX ORDER — LABETALOL HYDROCHLORIDE 5 MG/ML
10 INJECTION, SOLUTION INTRAVENOUS
Status: DISCONTINUED | OUTPATIENT
Start: 2025-04-04 | End: 2025-04-04 | Stop reason: HOSPADM

## 2025-04-04 RX ORDER — SODIUM CHLORIDE 0.9 % (FLUSH) 0.9 %
5-40 SYRINGE (ML) INJECTION EVERY 12 HOURS SCHEDULED
Status: DISCONTINUED | OUTPATIENT
Start: 2025-04-04 | End: 2025-04-04 | Stop reason: HOSPADM

## 2025-04-04 RX ORDER — PROCHLORPERAZINE EDISYLATE 5 MG/ML
5 INJECTION INTRAMUSCULAR; INTRAVENOUS
Status: COMPLETED | OUTPATIENT
Start: 2025-04-04 | End: 2025-04-04

## 2025-04-04 RX ORDER — MIDAZOLAM HYDROCHLORIDE 1 MG/ML
INJECTION, SOLUTION INTRAMUSCULAR; INTRAVENOUS
Status: DISCONTINUED | OUTPATIENT
Start: 2025-04-04 | End: 2025-04-04 | Stop reason: SDUPTHER

## 2025-04-04 RX ORDER — KETOROLAC TROMETHAMINE 30 MG/ML
30 INJECTION, SOLUTION INTRAMUSCULAR; INTRAVENOUS ONCE
Status: COMPLETED | OUTPATIENT
Start: 2025-04-04 | End: 2025-04-04

## 2025-04-04 RX ORDER — ACETAMINOPHEN 500 MG
500 TABLET ORAL ONCE
Status: COMPLETED | OUTPATIENT
Start: 2025-04-04 | End: 2025-04-04

## 2025-04-04 RX ORDER — ONDANSETRON 2 MG/ML
INJECTION INTRAMUSCULAR; INTRAVENOUS
Status: DISCONTINUED | OUTPATIENT
Start: 2025-04-04 | End: 2025-04-04 | Stop reason: SDUPTHER

## 2025-04-04 RX ORDER — DIPHENHYDRAMINE HYDROCHLORIDE 50 MG/ML
12.5 INJECTION, SOLUTION INTRAMUSCULAR; INTRAVENOUS
Status: DISCONTINUED | OUTPATIENT
Start: 2025-04-04 | End: 2025-04-04 | Stop reason: HOSPADM

## 2025-04-04 RX ORDER — ROCURONIUM BROMIDE 10 MG/ML
INJECTION, SOLUTION INTRAVENOUS
Status: DISCONTINUED | OUTPATIENT
Start: 2025-04-04 | End: 2025-04-04 | Stop reason: SDUPTHER

## 2025-04-04 RX ORDER — METHOCARBAMOL 100 MG/ML
INJECTION, SOLUTION INTRAMUSCULAR; INTRAVENOUS
Status: COMPLETED
Start: 2025-04-04 | End: 2025-04-04

## 2025-04-04 RX ORDER — MEPERIDINE HYDROCHLORIDE 25 MG/ML
12.5 INJECTION INTRAMUSCULAR; INTRAVENOUS; SUBCUTANEOUS EVERY 5 MIN PRN
Status: DISCONTINUED | OUTPATIENT
Start: 2025-04-04 | End: 2025-04-04 | Stop reason: HOSPADM

## 2025-04-04 RX ORDER — HYDRALAZINE HYDROCHLORIDE 20 MG/ML
10 INJECTION INTRAMUSCULAR; INTRAVENOUS
Status: DISCONTINUED | OUTPATIENT
Start: 2025-04-04 | End: 2025-04-04 | Stop reason: HOSPADM

## 2025-04-04 RX ORDER — DEXAMETHASONE SODIUM PHOSPHATE 10 MG/ML
INJECTION, SOLUTION INTRAMUSCULAR; INTRAVENOUS
Status: DISCONTINUED | OUTPATIENT
Start: 2025-04-04 | End: 2025-04-04 | Stop reason: SDUPTHER

## 2025-04-04 RX ORDER — FENTANYL CITRATE 50 UG/ML
25 INJECTION, SOLUTION INTRAMUSCULAR; INTRAVENOUS EVERY 5 MIN PRN
Status: DISCONTINUED | OUTPATIENT
Start: 2025-04-04 | End: 2025-04-04 | Stop reason: HOSPADM

## 2025-04-04 RX ORDER — LIDOCAINE HYDROCHLORIDE 20 MG/ML
INJECTION, SOLUTION INTRAVENOUS
Status: DISCONTINUED | OUTPATIENT
Start: 2025-04-04 | End: 2025-04-04 | Stop reason: SDUPTHER

## 2025-04-04 RX ORDER — METHOCARBAMOL 750 MG/1
750 TABLET, FILM COATED ORAL 4 TIMES DAILY
Qty: 40 TABLET | Refills: 0 | Status: SHIPPED | OUTPATIENT
Start: 2025-04-04 | End: 2025-04-14

## 2025-04-04 RX ORDER — SODIUM CHLORIDE, SODIUM LACTATE, POTASSIUM CHLORIDE, CALCIUM CHLORIDE 600; 310; 30; 20 MG/100ML; MG/100ML; MG/100ML; MG/100ML
INJECTION, SOLUTION INTRAVENOUS CONTINUOUS
Status: DISCONTINUED | OUTPATIENT
Start: 2025-04-04 | End: 2025-04-04 | Stop reason: HOSPADM

## 2025-04-04 RX ADMIN — FENTANYL CITRATE 50 MCG: 50 INJECTION, SOLUTION INTRAMUSCULAR; INTRAVENOUS at 07:50

## 2025-04-04 RX ADMIN — LIDOCAINE HYDROCHLORIDE 100 MG: 20 INJECTION, SOLUTION INTRAVENOUS at 07:09

## 2025-04-04 RX ADMIN — FENTANYL CITRATE 50 MCG: 50 INJECTION, SOLUTION INTRAMUSCULAR; INTRAVENOUS at 07:29

## 2025-04-04 RX ADMIN — ROCURONIUM BROMIDE 20 MG: 10 INJECTION, SOLUTION INTRAVENOUS at 07:58

## 2025-04-04 RX ADMIN — HYDROMORPHONE HYDROCHLORIDE 0.5 MG: 1 INJECTION, SOLUTION INTRAMUSCULAR; INTRAVENOUS; SUBCUTANEOUS at 08:34

## 2025-04-04 RX ADMIN — PROPOFOL 200 MG: 10 INJECTION, EMULSION INTRAVENOUS at 07:09

## 2025-04-04 RX ADMIN — PROCHLORPERAZINE EDISYLATE 5 MG: 5 INJECTION INTRAMUSCULAR; INTRAVENOUS at 13:04

## 2025-04-04 RX ADMIN — DEXAMETHASONE SODIUM PHOSPHATE 10 MG: 10 INJECTION INTRAMUSCULAR; INTRAVENOUS at 07:16

## 2025-04-04 RX ADMIN — METHOCARBAMOL 1000 MG: 100 INJECTION, SOLUTION INTRAMUSCULAR; INTRAVENOUS at 08:52

## 2025-04-04 RX ADMIN — SODIUM CHLORIDE, POTASSIUM CHLORIDE, SODIUM LACTATE AND CALCIUM CHLORIDE: 600; 310; 30; 20 INJECTION, SOLUTION INTRAVENOUS at 06:16

## 2025-04-04 RX ADMIN — CEFAZOLIN SODIUM 2000 MG: 1 POWDER, FOR SOLUTION INTRAMUSCULAR; INTRAVENOUS at 07:17

## 2025-04-04 RX ADMIN — METHOCARBAMOL 1000 MG: 100 INJECTION INTRAMUSCULAR; INTRAVENOUS at 08:52

## 2025-04-04 RX ADMIN — SODIUM CHLORIDE, POTASSIUM CHLORIDE, SODIUM LACTATE AND CALCIUM CHLORIDE: 600; 310; 30; 20 INJECTION, SOLUTION INTRAVENOUS at 06:58

## 2025-04-04 RX ADMIN — MIDAZOLAM 2 MG: 1 INJECTION INTRAMUSCULAR; INTRAVENOUS at 06:58

## 2025-04-04 RX ADMIN — ROCURONIUM BROMIDE 50 MG: 10 INJECTION, SOLUTION INTRAVENOUS at 07:10

## 2025-04-04 RX ADMIN — KETOROLAC TROMETHAMINE 30 MG: 30 INJECTION, SOLUTION INTRAMUSCULAR; INTRAVENOUS at 08:37

## 2025-04-04 RX ADMIN — SUGAMMADEX 200 MG: 100 INJECTION, SOLUTION INTRAVENOUS at 08:10

## 2025-04-04 RX ADMIN — ACETAMINOPHEN 500 MG: 500 TABLET ORAL at 10:47

## 2025-04-04 RX ADMIN — FENTANYL CITRATE 50 MCG: 50 INJECTION, SOLUTION INTRAMUSCULAR; INTRAVENOUS at 07:22

## 2025-04-04 RX ADMIN — HYDROMORPHONE HYDROCHLORIDE 0.5 MG: 1 INJECTION, SOLUTION INTRAMUSCULAR; INTRAVENOUS; SUBCUTANEOUS at 08:50

## 2025-04-04 RX ADMIN — FENTANYL CITRATE 50 MCG: 50 INJECTION, SOLUTION INTRAMUSCULAR; INTRAVENOUS at 07:09

## 2025-04-04 RX ADMIN — ONDANSETRON 4 MG: 2 INJECTION INTRAMUSCULAR; INTRAVENOUS at 07:17

## 2025-04-04 ASSESSMENT — PAIN DESCRIPTION - LOCATION
LOCATION: ABDOMEN
LOCATION: ABDOMEN

## 2025-04-04 ASSESSMENT — PAIN SCALES - GENERAL
PAINLEVEL_OUTOF10: 10
PAINLEVEL_OUTOF10: 5
PAINLEVEL_OUTOF10: 10

## 2025-04-04 ASSESSMENT — PAIN DESCRIPTION - DESCRIPTORS
DESCRIPTORS: ACHING;SORE
DESCRIPTORS: ACHING;CRAMPING
DESCRIPTORS: ACHING;DISCOMFORT
DESCRIPTORS: ACHING;CRAMPING;DISCOMFORT

## 2025-04-04 ASSESSMENT — PAIN DESCRIPTION - PAIN TYPE: TYPE: SURGICAL PAIN

## 2025-04-04 ASSESSMENT — PAIN - FUNCTIONAL ASSESSMENT
PAIN_FUNCTIONAL_ASSESSMENT: PREVENTS OR INTERFERES SOME ACTIVE ACTIVITIES AND ADLS
PAIN_FUNCTIONAL_ASSESSMENT: NONE - DENIES PAIN
PAIN_FUNCTIONAL_ASSESSMENT: 0-10
PAIN_FUNCTIONAL_ASSESSMENT: 0-10

## 2025-04-04 ASSESSMENT — PAIN DESCRIPTION - FREQUENCY: FREQUENCY: CONTINUOUS

## 2025-04-04 ASSESSMENT — PAIN DESCRIPTION - ORIENTATION: ORIENTATION: MID

## 2025-04-04 NOTE — ANESTHESIA PRE PROCEDURE
Department of Anesthesiology  Preprocedure Note       Name:  Yelena Harrell   Age:  49 y.o.  :  1975                                          MRN:  [id number]         Date:  2025      Surgeon: Surgeon(s):  Rick Osborne MD    Procedure: Procedure(s):  HERNIA VENTRAL REPAIR LAPAROSCOPIC ROBOTIC XI    Medications prior to admission:   Prior to Admission medications    Medication Sig Start Date End Date Taking? Authorizing Provider   norethindrone (AYGESTIN) 5 MG tablet Take 1 tablet by mouth daily    ProviderYas MD   acetaminophen (TYLENOL) 500 MG tablet Take 2 tablets by mouth every 6 hours as needed for Pain    Provider, MD Yas       Current medications:    Current Facility-Administered Medications   Medication Dose Route Frequency Provider Last Rate Last Admin    sodium chloride flush 0.9 % injection 5-40 mL  5-40 mL IntraVENous 2 times per day Maryellen Laguna APRN - CNP        sodium chloride flush 0.9 % injection 5-40 mL  5-40 mL IntraVENous PRN Maryellen Laguna APRN - CNP        0.9 % sodium chloride infusion   IntraVENous PRN Maryellen Laguna APRN - CNP        ceFAZolin (ANCEF) 2,000 mg in sterile water 20 mL IV syringe  2,000 mg IntraVENous On Call to OR Maryellen Laguna APRN - CNP        lactated ringers infusion   IntraVENous Continuous Maryellen Laguna APRN - CNP           Allergies:  No Known Allergies    Problem List:    Patient Active Problem List   Diagnosis Code    Impaired fasting glucose R73.01    Vitamin D deficiency E55.9    Obesity (BMI 30-39.9) E66.9    Diastasis recti M62.08    Ventral hernia K43.9       Past Medical History:        Diagnosis Date    Impaired fasting glucose     Ovarian cyst     Retinitis pigmentosa     Vitamin D deficiency        Past Surgical History:        Procedure Laterality Date    CATARACT EXTRACTION, BILATERAL      SHOULDER SURGERY Right     frozen shoulder       Social History:    Social History     Tobacco Use    Smoking status:

## 2025-04-04 NOTE — OP NOTE
Operative Note      Patient: Yelena Harrell  YOB: 1975  MRN: [id number]    Date of Procedure: 4/4/2025    Pre-Op Diagnosis Codes:      * Ventral hernia [K43.9]    Post-Op Diagnosis:  Incarcerated incisional hernia       Procedure(s):  HERNIA VENTRAL REPAIR LAPAROSCOPIC ROBOTIC XI    Surgeon(s):  Rick Osborne MD    Assistant:    Assistant: Pooja Leslie  Resident: Isaura Elliott MD    Anesthesia: General    Estimated Blood Loss (mL): less than 50     Complications: None    Specimens:   * No specimens in log *    Implants:  Implant Name Type Inv. Item Serial No.  Lot No. LRB No. Used Action   MESH DORI [id number] POLY POLYLACTIC ACID 70% CLLGN 30% GLYC - [id number]  MESH DORI [id number] POLY POLYLACTIC ACID 70% CLLGN 30% GLYC  [employer]  SURGICAL-WD [id number] N/A 1 Implanted         Drains: * No LDAs found *    Findings:  Infection Present At Time Of Surgery (PATOS) (choose all levels that have infection present):  No infection present  Other Findings: see below    Detailed Description of Procedure:     The patient was identified and the procedure was confirmed.  He was taken to the operating room and laid supine on the operating room table.  He underwent general anesthesia by the anesthesia team and was intubated.  HIs  abdomen was then prepped and draped in a sterile fashion.  He was given IV antibiotics prior to skin incision.     An 8 mm incision was made at Camarena's point in the left upper quadrant.  A Veress needle was passed into the abdominal cavity which was insufflated to 15 mmHg.  Next, the skin was anesthetized and 3 locations in the left lateral abdomen.  Three 8 mm trocars were placed in the left lateral abdomen.  The robot was then docked on the patient's left side.     Evaluation of the abdominal cavity revealed a 4 cm defect in the midline epigastrium at the insertion of the inferior border of the falciform ligament.  Another 2 cm defect was

## 2025-04-04 NOTE — DISCHARGE INSTRUCTIONS
Patient Discharge Instructions Hernia Repair  [facility]  Rick Osborne MD  [phone] (o)  [phone] (f)  Discharge Date:  4/4/2025    Discharged To:  Home    RESUME ACTIVITY:     WOUND CARE: The day of surgery be sure to place ice to site of operation for 15min intervals. No need to sleep with ice in place. Keep protective cloth barrier between ice bag and skin to prevent frostbite.     Bruising is normal after surgery. Ice will help reduce swelling and bruising. A bulge at the hernia site is normal after surgery and may persist for a few months. This is the normal healing process.    BATHING:  May shower 24hrs after surgery, remove dressings after 24hrs if in place, leave steristrips in place as they will fall of independently. You may have adhesive glue covering your incisions which will dissolve on it own.  May bathe or swim 5 days after surgery    DRIVING: No driving while on pain medications    RETURN TO WORK: after follow up appointment    WALKING:  Yes    SEXUAL ACTIVITY: Yes    STAIRS:  Yes    LIFTING: Less than 15 pounds for 4 weeks    DIET: General adult    SPECIAL INSTRUCTIONS:     Call physician if they or any other problems occur:  Fever over 101°    Redness, swelling, hardness or warmth at the operative site  Unrelieved nausea    Foul smelling or cloudy drainage at the operative site   Unrelieved pain    Blood soaked dressing. (Some oozing may be normal)    Call office for follow up appointment with Dr Osborne in 2 weeks.    Call the office at [phone] if you have a fever > 100 F, or if your incision becomes red, tender, or drains more than a small amount of clear fluid.    BOWELS: constipation is a side effect of your pain meds, take a daily laxative (miralax, dulcolax, etc.) as needed to keep your bowels moving as they normally do, do not go 2-3 days without having a bowel movement.     Pain medications;   Percocet- take at least 1/2 pill every 6 hours the first 36 hours  the cut, you will need to visit your doctor in 1 to 2 weeks to have them removed.  Wash the area daily with warm, soapy water and pat it dry.    Follow-up care is a key part of your treatment and safety. Be sure to make and go to all appointments, and call your doctor if you are having problems. It's also a good idea to know your test results and keep a list of the medicines you take.    When should you call for help?  Call 911 anytime you think you may need emergency care. For example, call if:  You passed out (lost consciousness).  You have sudden chest pain and shortness of breath, or you cough up blood.  You have severe pain in your belly.  Call your doctor now or seek immediate medical care if:  You are sick to your stomach and cannot keep fluids down.  You have signs of a blood clot, such as:  Pain in your calf, back of the knee, thigh, or groin.  Redness and swelling in your leg or groin.  You have trouble passing urine or stool, especially if you have mild pain or swelling in your lower belly.  Bright red blood has soaked through the bandage over your incision.  Watch closely for any changes in your health, and be sure to contact your doctor if:  Your swelling is getting worse.  Your swelling is not going down.

## 2025-04-04 NOTE — ANESTHESIA POSTPROCEDURE EVALUATION
Department of Anesthesiology  Postprocedure Note    Patient: Yelena Harrell  MRN: [id number]  YOB: 1975  Date of evaluation: 4/4/2025    Procedure Summary       Date: 04/04/25 Room / Location: [address]    Anesthesia Start: 0652 Anesthesia Stop: 0823    Procedure: HERNIA VENTRAL REPAIR LAPAROSCOPIC ROBOTIC XI Diagnosis:       Ventral hernia      (Ventral hernia [K43.9])    Surgeons: Rick Osborne MD Responsible Provider: Justin Hurtado DO    Anesthesia Type: general ASA Status: 2            Anesthesia Type: No value filed.    Edyta Phase I: Edyta Score: 9    Edyta Phase II: Edyta Score: 10    Anesthesia Post Evaluation    Patient location during evaluation: PACU  Patient participation: complete - patient participated  Level of consciousness: awake and alert  Pain score: 0  Airway patency: patent  Nausea & Vomiting: no nausea and no vomiting  Cardiovascular status: blood pressure returned to baseline and hemodynamically stable  Respiratory status: acceptable  Hydration status: stable  Pain management: adequate    No notable events documented.

## 2025-04-16 ENCOUNTER — OFFICE VISIT (OUTPATIENT)
Dept: SURGERY | Age: 50
End: 2025-04-16

## 2025-04-16 VITALS
TEMPERATURE: 98.6 F | WEIGHT: 185 LBS | HEART RATE: 93 BPM | RESPIRATION RATE: 18 BRPM | DIASTOLIC BLOOD PRESSURE: 86 MMHG | BODY MASS INDEX: 32.78 KG/M2 | HEIGHT: 63 IN | SYSTOLIC BLOOD PRESSURE: 144 MMHG

## 2025-04-16 DIAGNOSIS — Z98.890 S/P REPAIR OF VENTRAL HERNIA: Primary | ICD-10-CM

## 2025-04-16 DIAGNOSIS — Z87.19 S/P REPAIR OF VENTRAL HERNIA: Primary | ICD-10-CM

## 2025-04-16 PROCEDURE — 99212 OFFICE O/P EST SF 10 MIN: CPT | Performed by: SURGERY

## 2025-04-16 NOTE — PROGRESS NOTES
General Surgery Office Note  [facility]  Consandre JESSICA Osborne MD    Patient's Name/Date of Birth: Yelena Harrell / 1975    Date: April 16, 2025     Chief compaint: Postop visit from laparoscopic/robotic ventral hernia repair    Surgeon: MD Dylon    Patient Active Problem List   Diagnosis    Impaired fasting glucose    Vitamin D deficiency    Obesity (BMI 30-39.9)    Diastasis recti    Ventral hernia       Subjective: Doing well, no new complaints, pain prior to surgery has resolved, tolerating diet, bowel function normal, anxious to return to normal physical activity    Objective:  BP (!) 144/86 (BP Site: Left Upper Arm, Patient Position: Sitting, BP Cuff Size: Large Adult)   Pulse 93   Temp 98.6 °F (37 °C) (Temporal)   Resp 18   Ht 1.6 m (5' 3\")   Wt 83.9 kg (185 lb)   LMP  (LMP Unknown)   BMI 32.77 kg/m²   Labs:  No results for input(s): \"WBC\", \"HGB\", \"HCT\" in the last 72 hours.    Invalid input(s): \"PLR\"  Lab Results   Component Value Date    CREATININE 0.8 09/30/2019    BUN 21 (H) 09/30/2019     09/30/2019    K 4.4 09/30/2019     09/30/2019    CO2 24 09/30/2019     No results for input(s): \"LIPASE\", \"AMYLASE\" in the last 72 hours.    General appearance: AA, NAD  HEENT: NCAT, PERRLA, EOMI  Lungs: Clear, equal rise bilateral  Heart: Reg  Abdomen: soft, nondistended, nontender, incisions well healed, no signs of infection, no cellulitis, no hematoma      Assessment/Plan:  Yelena Harrell is a 49 y.o. female 2 weeks s/p laparoscopic/robotic ventral hernia repair. Doing well.    Resume activity gradually   No heavy lifting more than 20lbs for 6 weeks total  Follow up as needed    Physician Signature: Electronically signed by Dr. Osborne  4/16/2025  1:11 PM

## 2025-06-04 ENCOUNTER — TELEPHONE (OUTPATIENT)
Dept: INTERNAL MEDICINE CLINIC | Age: 50
End: 2025-06-04

## 2025-06-12 ENCOUNTER — OFFICE VISIT (OUTPATIENT)
Dept: INTERNAL MEDICINE CLINIC | Age: 50
End: 2025-06-12

## 2025-06-12 VITALS
SYSTOLIC BLOOD PRESSURE: 122 MMHG | OXYGEN SATURATION: 97 % | HEIGHT: 63 IN | BODY MASS INDEX: 32.96 KG/M2 | WEIGHT: 186 LBS | DIASTOLIC BLOOD PRESSURE: 82 MMHG | TEMPERATURE: 98.1 F | HEART RATE: 89 BPM

## 2025-06-12 DIAGNOSIS — E55.9 VITAMIN D DEFICIENCY: ICD-10-CM

## 2025-06-12 DIAGNOSIS — Z12.11 ENCOUNTER FOR SCREENING FOR MALIGNANT NEOPLASM OF COLON: ICD-10-CM

## 2025-06-12 DIAGNOSIS — R73.01 IMPAIRED FASTING GLUCOSE: Primary | ICD-10-CM

## 2025-06-12 DIAGNOSIS — E66.9 OBESITY (BMI 30-39.9): ICD-10-CM

## 2025-06-12 DIAGNOSIS — Z12.31 ENCOUNTER FOR SCREENING MAMMOGRAM FOR BREAST CANCER: ICD-10-CM

## 2025-06-12 PROCEDURE — 99214 OFFICE O/P EST MOD 30 MIN: CPT | Performed by: INTERNAL MEDICINE

## 2025-06-12 SDOH — ECONOMIC STABILITY: FOOD INSECURITY: WITHIN THE PAST 12 MONTHS, YOU WORRIED THAT YOUR FOOD WOULD RUN OUT BEFORE YOU GOT MONEY TO BUY MORE.: NEVER TRUE

## 2025-06-12 SDOH — ECONOMIC STABILITY: FOOD INSECURITY: WITHIN THE PAST 12 MONTHS, THE FOOD YOU BOUGHT JUST DIDN'T LAST AND YOU DIDN'T HAVE MONEY TO GET MORE.: NEVER TRUE

## 2025-06-12 ASSESSMENT — ENCOUNTER SYMPTOMS
VOICE CHANGE: 0
ABDOMINAL PAIN: 0
RHINORRHEA: 0
SORE THROAT: 0
PHOTOPHOBIA: 0
WHEEZING: 0
DIARRHEA: 0
VOMITING: 0
BACK PAIN: 0
BLOOD IN STOOL: 0
TROUBLE SWALLOWING: 0
SHORTNESS OF BREATH: 0
EYE PAIN: 0
CHEST TIGHTNESS: 0
CONSTIPATION: 0
COUGH: 0
NAUSEA: 0

## 2025-06-12 ASSESSMENT — PATIENT HEALTH QUESTIONNAIRE - PHQ9
2. FEELING DOWN, DEPRESSED OR HOPELESS: NOT AT ALL
SUM OF ALL RESPONSES TO PHQ QUESTIONS 1-9: 0
1. LITTLE INTEREST OR PLEASURE IN DOING THINGS: NOT AT ALL
SUM OF ALL RESPONSES TO PHQ QUESTIONS 1-9: 0

## 2025-06-12 NOTE — PROGRESS NOTES
Yelena Harrell (:  1975) is a 49 y.o. female,Established patient, here for evaluation of the following chief complaint(s):  Check-Up (Regular check up), Discuss Labs (25), and Fatigue (Feeling very tired, exhausted)        Subjective   SUBJECTIVE/OBJECTIVE:  HPI  Patient is here for a follow-up today.  For the most part she feels well.  She complains of feeling tired.  No chest pain or shortness of breath.  No cough no wheeze.  No recent changes with medications.    Review of Systems   Constitutional:  Negative for chills, fatigue, fever and unexpected weight change.   HENT:  Negative for congestion, postnasal drip, rhinorrhea, sore throat, trouble swallowing and voice change.    Eyes:  Negative for photophobia, pain and visual disturbance.   Respiratory:  Negative for cough, chest tightness, shortness of breath and wheezing.    Cardiovascular:  Negative for chest pain and palpitations.   Gastrointestinal:  Negative for abdominal pain, blood in stool, constipation, diarrhea, nausea and vomiting.   Endocrine: Negative for heat intolerance, polydipsia and polyuria.   Genitourinary:  Negative for difficulty urinating, dysuria, frequency, hematuria and urgency.   Musculoskeletal:  Negative for arthralgias, back pain, neck pain and neck stiffness.   Skin:  Negative for pallor.   Neurological: Negative.  Negative for dizziness and light-headedness.   Hematological:  Does not bruise/bleed easily.          Objective   /82   Pulse 89   Temp 98.1 °F (36.7 °C) (Temporal)   Ht 1.6 m (5' 3\")   Wt 84.4 kg (186 lb)   LMP  (LMP Unknown)   SpO2 97%   BMI 32.95 kg/m²       The ASCVD Risk score ([place] DK, et al., 2019) failed to calculate for the following reasons:    Cannot find a previous HDL lab    Cannot find a previous total cholesterol lab     Physical Exam  Constitutional:       Appearance: Normal appearance.   HENT:      Head: Normocephalic and atraumatic.      Mouth/Throat:      Pharynx:

## 2025-07-29 PROBLEM — R10.9 LEFT SIDED ABDOMINAL PAIN: Status: ACTIVE | Noted: 2025-07-29

## 2025-07-29 NOTE — PROGRESS NOTES
General Surgery Office Note  [facility]  Consandre JESSICA Osborne MD    Patient's Name/Date of Birth: Yelena Harrell / 1975    Date: July 30, 2025     Chief compaint: L sided abdominal pain. S/p April 2025 laparoscopic/robotic ventral hernia repair    Surgeon: MD Dylon    Patient Active Problem List   Diagnosis    Impaired fasting glucose    Vitamin D deficiency    Obesity (BMI 30-39.9)    Diastasis recti    Ventral hernia    S/P repair of ventral hernia    Left sided abdominal pain       Subjective: Reports left upper quadrant abdominal pain.  She reports spasms which is intermittent.  Sometimes it takes her breath away.  She denies any new bulge in the upper abdomen.  She has had no skin changes.  The pain is worse with activity and gets better with rest.    Objective:  /64 (BP Site: Left Upper Arm, Patient Position: Sitting, BP Cuff Size: Medium Adult)   Pulse 83   Temp 97.9 °F (36.6 °C) (Temporal)   Resp 16   Ht 1.6 m (5' 3\")   Wt 84.4 kg (186 lb)   LMP  (LMP Unknown)   SpO2 97%   BMI 32.95 kg/m²   Labs:  No results for input(s): \"WBC\", \"HGB\", \"HCT\" in the last 72 hours.    Invalid input(s): \"PLR\"  Lab Results   Component Value Date    CREATININE 0.8 09/30/2019    BUN 21 (H) 09/30/2019     09/30/2019    K 4.4 09/30/2019     09/30/2019    CO2 24 09/30/2019     No results for input(s): \"LIPASE\", \"AMYLASE\" in the last 72 hours.    General appearance: AA, NAD  HEENT: NCAT, PERRLA, EOMI  Lungs: Clear, equal rise bilateral  Heart: Reg  Abdomen: soft, nondistended, nontender, incisions well healed, no lump or bulge noted on        Assessment/Plan:  Yelena Harrell is a 49 y.o. female 3 months laparoscopic/robotic ventral hernia repair.  Now with left upper quadrant abdominal pain    Robaxin and ibuprofen were prescribed  I recommend follow-up in 3 to 4 weeks if pain persists.  We discussed getting CT of the abdomen pelvis if pain worsens or is still present in 1

## 2025-07-30 ENCOUNTER — OFFICE VISIT (OUTPATIENT)
Dept: SURGERY | Age: 50
End: 2025-07-30

## 2025-07-30 VITALS
OXYGEN SATURATION: 97 % | RESPIRATION RATE: 16 BRPM | SYSTOLIC BLOOD PRESSURE: 118 MMHG | DIASTOLIC BLOOD PRESSURE: 64 MMHG | BODY MASS INDEX: 32.96 KG/M2 | TEMPERATURE: 97.9 F | HEART RATE: 83 BPM | WEIGHT: 186 LBS | HEIGHT: 63 IN

## 2025-07-30 DIAGNOSIS — M62.838 MUSCLE SPASM: ICD-10-CM

## 2025-07-30 DIAGNOSIS — R10.9 LEFT SIDED ABDOMINAL PAIN: Primary | ICD-10-CM

## 2025-07-30 PROCEDURE — 99212 OFFICE O/P EST SF 10 MIN: CPT | Performed by: SURGERY

## 2025-07-30 RX ORDER — METHOCARBAMOL 750 MG/1
750 TABLET, FILM COATED ORAL 4 TIMES DAILY
Qty: 40 TABLET | Refills: 0 | Status: SHIPPED | OUTPATIENT
Start: 2025-07-30 | End: 2025-08-09

## 2025-07-30 RX ORDER — IBUPROFEN 600 MG/1
600 TABLET, FILM COATED ORAL 3 TIMES DAILY PRN
Qty: 30 TABLET | Refills: 0 | Status: SHIPPED | OUTPATIENT
Start: 2025-07-30

## 2025-07-31 PROBLEM — M62.838 MUSCLE SPASM: Status: ACTIVE | Noted: 2025-07-31

## (undated) DEVICE — GLOVE ORANGE PI 7 1/2   MSG9075

## (undated) DEVICE — BINDER ABD UNISX 4 PNL PREM 6INX6INX12IN L XL 4

## (undated) DEVICE — NEEDLE HYPO 25GA L1.5IN BLU POLYPR HUB S STL REG BVL STR

## (undated) DEVICE — WARMER SCP LAP

## (undated) DEVICE — SUTURE BARB NON ABSORBABLE V LOC PBT BLU SZ 0 LEN 9 IN GS 21 VLOCN0346

## (undated) DEVICE — SUTURE ABSRB L6IN L37MM 0 GS-21 GRN 1/2 CIR TAPR PNT NDL VLOCL0306

## (undated) DEVICE — COVER,TABLE,44X90,STERILE: Brand: MEDLINE

## (undated) DEVICE — SYRINGE MED 10ML TRNSLUC BRL PLUNG BLK MRK POLYPR CTRL

## (undated) DEVICE — PROGRASP FORCEPS: Brand: ENDOWRIST

## (undated) DEVICE — MARKER,SKIN,WI/RULER AND LABELS: Brand: MEDLINE

## (undated) DEVICE — SUTURE V-LOC 180 SZ 0 L9IN ABSRB GRN GS-21 L37MM 1/2 CIR VLOCL0346

## (undated) DEVICE — SHEET,DRAPE,40X58,STERILE: Brand: MEDLINE

## (undated) DEVICE — ELECTRODE PT RET AD L9FT HI MOIST COND ADH HYDRGEL CORDED

## (undated) DEVICE — WIPES SKIN CLOTH READYPREP 9 X 10.5 IN 2% CHLORHEX GLUCONATE CHG PREOP

## (undated) DEVICE — ARM DRAPE

## (undated) DEVICE — TIP COVER ACCESSORY

## (undated) DEVICE — LIQUIBAND RAPID ADHESIVE 36/CS 0.8ML: Brand: MEDLINE

## (undated) DEVICE — SCISSORS SURG DIA8MM MPLR CRV ENDOWRIST

## (undated) DEVICE — PLUMEPORT ACTIV LAPAROSCOPIC SMOKE FILTRATION DEVICE: Brand: PLUMEPORT ACTIVE

## (undated) DEVICE — INSUFFLATION NEEDLE TO ESTABLISH PNEUMOPERITONEUM.: Brand: INSUFFLATION NEEDLE

## (undated) DEVICE — COVER,LIGHT HANDLE,FLX,1/PK: Brand: MEDLINE INDUSTRIES, INC.

## (undated) DEVICE — [HIGH FLOW INSUFFLATOR,  DO NOT USE IF PACKAGE IS DAMAGED,  KEEP DRY,  KEEP AWAY FROM SUNLIGHT,  PROTECT FROM HEAT AND RADIOACTIVE SOURCES.]: Brand: PNEUMOSURE

## (undated) DEVICE — GOWN,SIRUS,POLYRNF,BRTHSLV,XLN/XXL,18/CS: Brand: MEDLINE

## (undated) DEVICE — GLOVE SURG SZ 65 THK91MIL LTX FREE SYN POLYISOPRENE

## (undated) DEVICE — ELECTRO LUBE IS A SINGLE PATIENT USE DEVICE THAT IS INTENDED TO BE USED ON ELECTROSURGICAL ELECTRODES TO REDUCE STICKING.: Brand: KEY SURGICAL ELECTRO LUBE

## (undated) DEVICE — COLUMN DRAPE

## (undated) DEVICE — GARMENT,MEDLINE,DVT,INT,CALF,MED, GEN2: Brand: MEDLINE

## (undated) DEVICE — TOWEL,OR,DSP,ST,BLUE,STD,6/PK,12PK/CS: Brand: MEDLINE

## (undated) DEVICE — SOLUTION IV 1000 ML 0.9 NACL INJ USP EXCEL PLAS CONTAINER

## (undated) DEVICE — 4-PORT MANIFOLD: Brand: NEPTUNE 2

## (undated) DEVICE — SHEET,DRAPE,70X100,STERILE: Brand: MEDLINE

## (undated) DEVICE — GOWN,SIRUS,NONRNF,SETINSLV,XL,20/CS: Brand: MEDLINE

## (undated) DEVICE — APPLICATOR MEDICATED 26 CC SOLUTION HI LT ORNG CHLORAPREP

## (undated) DEVICE — SUTURE DEV SZ 0 L6IN N ABSORBABLE

## (undated) DEVICE — Device

## (undated) DEVICE — BASIC DOUBLE BASIN 2-LF: Brand: MEDLINE INDUSTRIES, INC.

## (undated) DEVICE — SEAL